# Patient Record
Sex: FEMALE | Race: WHITE | Employment: UNEMPLOYED | URBAN - METROPOLITAN AREA
[De-identification: names, ages, dates, MRNs, and addresses within clinical notes are randomized per-mention and may not be internally consistent; named-entity substitution may affect disease eponyms.]

---

## 2022-08-24 LAB — HBA1C MFR BLD HPLC: 5.7 %

## 2022-11-21 ENCOUNTER — HOSPITAL ENCOUNTER (EMERGENCY)
Age: 47
Discharge: HOME | End: 2022-11-21
Attending: EMERGENCY MEDICINE
Payer: MEDICAID

## 2022-11-21 ENCOUNTER — APPOINTMENT (OUTPATIENT)
Dept: GENERAL RADIOLOGY | Age: 47
End: 2022-11-21
Attending: EMERGENCY MEDICINE
Payer: MEDICAID

## 2022-11-21 VITALS
HEIGHT: 59 IN | RESPIRATION RATE: 19 BRPM | WEIGHT: 215 LBS | TEMPERATURE: 98.3 F | BODY MASS INDEX: 43.34 KG/M2 | OXYGEN SATURATION: 98 % | DIASTOLIC BLOOD PRESSURE: 73 MMHG | SYSTOLIC BLOOD PRESSURE: 127 MMHG | HEART RATE: 80 BPM

## 2022-11-21 DIAGNOSIS — J90 PLEURAL EFFUSION: ICD-10-CM

## 2022-11-21 DIAGNOSIS — R07.9 CHEST PAIN, UNSPECIFIED TYPE: Primary | ICD-10-CM

## 2022-11-21 DIAGNOSIS — I51.7 CARDIOMEGALY: ICD-10-CM

## 2022-11-21 LAB
ALBUMIN SERPL-MCNC: 3.3 G/DL (ref 3.5–5)
ALBUMIN/GLOB SERPL: 0.8 {RATIO} (ref 1.1–2.2)
ALP SERPL-CCNC: 66 U/L (ref 45–117)
ALT SERPL-CCNC: 44 U/L (ref 12–78)
ANION GAP SERPL CALC-SCNC: 8 MMOL/L (ref 5–15)
AST SERPL-CCNC: 22 U/L (ref 15–37)
ATRIAL RATE: 80 BPM
BASOPHILS # BLD: 0 K/UL (ref 0–0.1)
BASOPHILS NFR BLD: 1 % (ref 0–1)
BILIRUB SERPL-MCNC: 0.8 MG/DL (ref 0.2–1)
BNP SERPL-MCNC: 114 PG/ML
BUN SERPL-MCNC: 11 MG/DL (ref 6–20)
BUN/CREAT SERPL: 12 (ref 12–20)
CALCIUM SERPL-MCNC: 8.8 MG/DL (ref 8.5–10.1)
CALCULATED P AXIS, ECG09: 39 DEGREES
CALCULATED R AXIS, ECG10: -3 DEGREES
CALCULATED T AXIS, ECG11: 30 DEGREES
CHLORIDE SERPL-SCNC: 110 MMOL/L (ref 97–108)
CO2 SERPL-SCNC: 26 MMOL/L (ref 21–32)
CREAT SERPL-MCNC: 0.91 MG/DL (ref 0.55–1.02)
DIAGNOSIS, 93000: NORMAL
DIFFERENTIAL METHOD BLD: NORMAL
EOSINOPHIL # BLD: 0.2 K/UL (ref 0–0.4)
EOSINOPHIL NFR BLD: 4 % (ref 0–7)
ERYTHROCYTE [DISTWIDTH] IN BLOOD BY AUTOMATED COUNT: 14.5 % (ref 11.5–14.5)
GLOBULIN SER CALC-MCNC: 3.9 G/DL (ref 2–4)
GLUCOSE SERPL-MCNC: 100 MG/DL (ref 65–100)
HCT VFR BLD AUTO: 40.9 % (ref 35–47)
HGB BLD-MCNC: 13.2 G/DL (ref 11.5–16)
IMM GRANULOCYTES # BLD AUTO: 0 K/UL (ref 0–0.04)
IMM GRANULOCYTES NFR BLD AUTO: 0 % (ref 0–0.5)
LYMPHOCYTES # BLD: 2 K/UL (ref 0.8–3.5)
LYMPHOCYTES NFR BLD: 33 % (ref 12–49)
MAGNESIUM SERPL-MCNC: 1.9 MG/DL (ref 1.6–2.4)
MCH RBC QN AUTO: 31.1 PG (ref 26–34)
MCHC RBC AUTO-ENTMCNC: 32.3 G/DL (ref 30–36.5)
MCV RBC AUTO: 96.5 FL (ref 80–99)
MONOCYTES # BLD: 0.4 K/UL (ref 0–1)
MONOCYTES NFR BLD: 7 % (ref 5–13)
NEUTS SEG # BLD: 3.4 K/UL (ref 1.8–8)
NEUTS SEG NFR BLD: 55 % (ref 32–75)
NRBC # BLD: 0 K/UL (ref 0–0.01)
NRBC BLD-RTO: 0 PER 100 WBC
P-R INTERVAL, ECG05: 142 MS
PLATELET # BLD AUTO: 298 K/UL (ref 150–400)
PMV BLD AUTO: 9 FL (ref 8.9–12.9)
POTASSIUM SERPL-SCNC: 4 MMOL/L (ref 3.5–5.1)
PROT SERPL-MCNC: 7.2 G/DL (ref 6.4–8.2)
Q-T INTERVAL, ECG07: 418 MS
QRS DURATION, ECG06: 112 MS
QTC CALCULATION (BEZET), ECG08: 482 MS
RBC # BLD AUTO: 4.24 M/UL (ref 3.8–5.2)
SODIUM SERPL-SCNC: 144 MMOL/L (ref 136–145)
TROPONIN-HIGH SENSITIVITY: 10 NG/L (ref 0–51)
TROPONIN-HIGH SENSITIVITY: 11 NG/L (ref 0–51)
VENTRICULAR RATE, ECG03: 80 BPM
WBC # BLD AUTO: 6.2 K/UL (ref 3.6–11)

## 2022-11-21 PROCEDURE — 99284 EMERGENCY DEPT VISIT MOD MDM: CPT | Performed by: SPECIALIST

## 2022-11-21 PROCEDURE — 36415 COLL VENOUS BLD VENIPUNCTURE: CPT

## 2022-11-21 PROCEDURE — 84484 ASSAY OF TROPONIN QUANT: CPT

## 2022-11-21 PROCEDURE — 83735 ASSAY OF MAGNESIUM: CPT

## 2022-11-21 PROCEDURE — 83880 ASSAY OF NATRIURETIC PEPTIDE: CPT

## 2022-11-21 PROCEDURE — 74011250637 HC RX REV CODE- 250/637: Performed by: EMERGENCY MEDICINE

## 2022-11-21 PROCEDURE — 99285 EMERGENCY DEPT VISIT HI MDM: CPT

## 2022-11-21 PROCEDURE — 71045 X-RAY EXAM CHEST 1 VIEW: CPT

## 2022-11-21 PROCEDURE — 85025 COMPLETE CBC W/AUTO DIFF WBC: CPT

## 2022-11-21 PROCEDURE — 80053 COMPREHEN METABOLIC PANEL: CPT

## 2022-11-21 PROCEDURE — 93005 ELECTROCARDIOGRAM TRACING: CPT

## 2022-11-21 RX ORDER — RANOLAZINE 1000 MG/1
1000 TABLET, EXTENDED RELEASE ORAL 2 TIMES DAILY
Qty: 60 TABLET | Refills: 1 | Status: SHIPPED | OUTPATIENT
Start: 2022-11-21

## 2022-11-21 RX ADMIN — NITROGLYCERIN 1 INCH: 20 OINTMENT TOPICAL at 10:12

## 2022-11-21 NOTE — CONSULTS
[address] [facility]                                                          Cardiology Care Note               Anil Jackson MD, [address], [place], [address] [facility]             Phone [phone]; Fax [phone]           Mobile [phone]   Voice Mail [phone]     [x]Initial Encounter     []Follow-up    Patient Name: Raul Bolivar - :1975 - NPD:[id number]  Primary Cardiologist: [facility] Physicians: Tracey Ferrara [facility] Cardiologist: [facility] Physicians: Eunice Roman MD     Reason for encounter: Chest discomfort with negative troponin's    HPI:       Raul Bolivar is a 56 y.o. female with PMH significant for Ischemic CM  with CAD and 3 stents placed 5 yrs ago ,S/P ICD and currently in process of determining if she is a candidate for mitral ring procedure for LV remodeling. She eliceo a normal QRS so no BiV was placed. She states she is under a lot of stress. She is currently on Ranexa from  microvascular disease. She did have a heart cath recently with no disease amenable to intervention. Spoke with  from [address] [place] [address] regarding his patient and discussed with him increasing rannexa and patient is in agreement. She will follow up with Dr. Sameer Chester. Subjective:    Patient arrived via EMS from home with c/o CP that woke her up around 0200. Patient took sublingual nitro POA that relieved the pain. Patient stated pain is gone but has a slight dull ache. Per EMS, 324 ASA given en route. Denies worsening SOB. Patient noted increased lower extremity edema. In ER HS trop 10 and pBNP  114  CXR enlarged heart dimensions and left pleural effusion  ECG NSR        Raul Bolivar reports chest pressure/discomfort. Cardiac risk factors: family history, dyslipidemia, obesity, hypertension, stress.      Assessment and Plan     1) Chest discomfort  -recent cath with no disease requiring intervention per   -most likely component of microvascular disease  -will increase rannexa to 1000 mg bid  - Zohra Blackwood will arrange for follow   2) COPD  3) [place]  -no wearing CPAP regularly and last night did not use  -encourage to use regularly  4) CHF  -BNP normal  5) HTN  -ok  6) Dyslipidemia  -LDL goal is less than 70  7) Obesity  -weight loss is encouraged. OK to discharge per CARDS    Approximately 32 min in discussion with patient and her physician and reviewing chart 15-20 min.       ____________________________________________________________    Cardiac testing          Most recent HS troponins:  Recent Labs     11/21/22  1003   TROPHS 10       ECG: normal EKG, normal sinus rhythm, possible old septal MI    Review of Systems:    [x]All other systems reviewed and all negative except as written in HPI    [] Patient unable to provide secondary to condition    Past Medical History:   Diagnosis Date    Asthma 1980    Bronchiectasis ([facility] 1981    CAD (coronary artery disease)     Chronic obstructive pulmonary disease ([facility] 2017    Congestive heart failure (CHF) ([facility] 2017    Heart failure ([facility]     Hypertension 2013    Kidney stones 1999    Sleep apnea 2014    uses CPAP     Past Surgical History:   Procedure Laterality Date    BREAST SURGERY PROCEDURE UNLISTED      CARDIAC SURG PROCEDURE UNLIST  2017    stents x3    HX BREAST REDUCTION Bilateral 1997    HX CSF SHUNT  2002    HX GYN      HX HEART CATHETERIZATION  2017 and 2019    HX HYSTEROSCOPY WITH ENDOMETRIAL ABLATION  2018    HX LITHOTRIPSY      mutliple    HX TONSILLECTOMY  age 26    HX TUBAL LIGATION Bilateral 2002    NEUROLOGICAL PROCEDURE UNLISTED       Social Hx:  reports that she has never smoked. She has never used smokeless tobacco. She reports that she does not drink alcohol and does not use drugs. Family Hx: family history is not on file.   Allergies   Allergen Reactions    Adhesive Rash          OBJECTIVE:  Wt Readings from Last 3 Encounters:   11/21/22 215 lb (97.5 kg)   12/06/19 208 lb 5.4 oz (94.5 kg)   12/05/19 208 lb 7 oz (94.5 kg)     No intake or output data in the 24 hours ending 11/21/22 1335    Physical Exam:    Vitals:   Vitals:    11/21/22 0908 11/21/22 0909 11/21/22 1008   BP:  120/68 126/73   Pulse: 81 83 81   Resp:  18 18   Temp:  98.3 °F (36.8 °C)    SpO2:  96% 96%   Weight:  215 lb (97.5 kg)    Height:  4' 11\" (1.499 m)          Gen: Well-developed, well-nourished, in no acute distress, overweight  Neck: Supple, No JVD, No Carotid Bruit  Resp: No accessory muscle use, Clear breath sounds, No rales or rhonchi  Card: Regular Rate,Rythm, Normal S1, S2, No murmurs, rubs or gallop. No thrills. Abd:   Soft, non-tender, non-distended, BS+   MSK: No cyanosis  Skin: No rashes    Neuro: Moving all four extremities, follows commands appropriately  Psych: Good insight, oriented to person, place, alert, Nml Affect  LE: No edema    No specialty comments available. Data Review:     Radiology:   XR Results (most recent):  Results from Hospital Encounter encounter on 11/21/22    XR CHEST PORT    Narrative  EXAM:  XR CHEST PORT    INDICATION: Chest pain    COMPARISON: none    TECHNIQUE: portable chest AP view    FINDINGS there is mild enlargement of the cardiomediastinal silhouette. There is  a mild sized left pleural effusion. There is no focal infiltrate, cardiac  pacemaker. Impression  Enlarged heart with left pleural effusion. Recent Labs     11/21/22  1003      K 4.0   *   CO2 26   BUN 11   CREA 0.91      CA 8.8     Recent Labs     11/21/22  1003   WBC 6.2   HGB 13.2   HCT 40.9        Recent Labs     11/21/22  1003   AP 66     No results for input(s): CHOL, LDLC in the last 72 hours.     No lab exists for component: TGL, HDLC,  HBA1C      Current meds:    Current Facility-Administered Medications:     nitroglycerin (NITROBID) 2 % ointment 1 Inch, 1 Inch, Topical, BID, Memo Dumont MD, 1 Inch at 11/21/22 1012    Current Outpatient Medications:     docusate sodium (COLACE) 50 mg capsule, Take two tabs twice daily for one week, then twice a day as needed thereafter. Hold for loose stools. , Disp: 28 Cap, Rfl: 2    ondansetron (ZOFRAN ODT) 8 mg disintegrating tablet, Take 1 Tab by mouth every eight (8) hours as needed for Nausea., Disp: 10 Tab, Rfl: 2    mometasone-formoterol (DULERA) 100-5 mcg/actuation HFA inhaler, Take 2 Puffs by inhalation two (2) times a day., Disp: , Rfl:     ALBUTEROL SULFATE IN, Take 2 Puffs by inhalation two (2) times daily as needed (Rescue Inhaler). , Disp: , Rfl:     atorvastatin (LIPITOR) 80 mg tablet, Take 80 mg by mouth daily. , Disp: , Rfl:     aspirin 81 mg chewable tablet, Take 81 mg by mouth daily. , Disp: , Rfl:     sertraline (ZOLOFT) 50 mg tablet, Take 50 mg by mouth daily. , Disp: , Rfl:     tiotropium (SPIRIVA WITH HANDIHALER) 18 mcg inhalation capsule, Take 1 Cap by inhalation daily. , Disp: , Rfl:     carvedilol (COREG) 6.25 mg tablet, Take 6.25 mg by mouth two (2) times daily (with meals). , Disp: , Rfl:     ibuprofen (MOTRIN) 800 mg tablet, Take 800 mg by mouth every eight (8) hours as needed for Pain., Disp: , Rfl:     nitroglycerin (NITROSTAT) 0.4 mg SL tablet, 0.4 mg by SubLINGual route every five (5) minutes as needed for Chest Pain. Up to 3 doses. , Disp: , Rfl:     bumetanide (BUMEX) 1 mg tablet, Take 1 mg by mouth daily. , Disp: , Rfl:     ramipril (ALTACE) 2.5 mg capsule, Take 2.5 mg by mouth daily. , Disp: , Rfl:     OTHER, Take 1 Inhalation by mouth two (2) times a day. Indications: Albuterol nebulizer - pt not sure of strength, Disp: , Rfl:     Luciano Porras MD    [facility]  Call center: (Q) [phone] (g) [phone]      CC: Bandar Youssef NP

## 2022-11-21 NOTE — Clinical Note
[address]  [facility] EMERGENCY DEPT  [address] [facility]  [place] [address]  [phone]    Work/School Note    Date: 11/21/2022    To Whom It May concern:    Sylvia Sexton was seen and treated today in the emergency room by the following provider(s):  Attending Provider: Caitlin Retana MD.      Sylvia Sexton is excused from work/school on 11/21/2022 through 11/23/2022. She is medically clear to return to work/school on 11/24/2022.          Sincerely,          Jasmin Ramires MD

## 2022-11-21 NOTE — ED TRIAGE NOTES
Patient arrived via EMS from home with c/o CP that woke her up around 0200. Patient took sublingual nitro POA that relieved the pain. Patient stated pain is gone but has a slight dull ache. Per EMS, 324 ASA given en route. Denies worsening SOB. Patient noted increased lower extremity edema.      PMH Heart failure -EF 15% ICD placed

## 2022-11-21 NOTE — ED PROVIDER NOTES
Patient is a pleasant 44-year-old female with past medical history significant for asthma, coronary artery disease status post multiple stents, congestive heart failure with last EF in the 15% range, hypertension and apnea using CPAP. She states she is typically followed at [facility] for her cardiac care. She states she was last seen over the summer for heart failure exacerbation. She states she thinks they did a repeat cath and at that time she did not have any significant obstructions requiring intervention and she states she does not smoke cigarettes. She states she was told that her heart failure was caused by her heart disease. Denies significant peripheral swelling noting that she does swell in her legs if she stands for long but that it seems to resolve with elevation. Denies shortness of breath or orthopnea. Denies any fever. She states around 2:00 in the morning she was awoken by an on radiating chest pain that seemed to resolve after some time. She states she was able to go back to sleep for little while and then developed the pain again. She states she does have some diaphoresis associated but notes that she is often diaphoretic and does not think that that is anything new.        Past Medical History:   Diagnosis Date    Asthma 1980    Bronchiectasis ([address] 1981    CAD (coronary artery disease)     Chronic obstructive pulmonary disease ([address] 2017    Congestive heart failure (CHF) ([address] 2017    Heart failure ([address]     Hypertension 2013    Kidney stones 1999    Sleep apnea 2014    uses CPAP       Past Surgical History:   Procedure Laterality Date    BREAST SURGERY PROCEDURE UNLISTED      CARDIAC SURG PROCEDURE UNLIST  2017    stents x3    HX BREAST REDUCTION Bilateral 1997    HX CSF SHUNT  2002    HX GYN      HX HEART CATHETERIZATION  2017 and 2019    HX HYSTEROSCOPY WITH ENDOMETRIAL ABLATION  2018    HX LITHOTRIPSY      mutliple    HX TONSILLECTOMY  age 26    HX TUBAL LIGATION Bilateral 2002 NEUROLOGICAL PROCEDURE UNLISTED           No family history on file. Social History     Socioeconomic History    Marital status: SINGLE     Spouse name: Not on file    Number of children: Not on file    Years of education: Not on file    Highest education level: Not on file   Occupational History    Not on file   Tobacco Use    Smoking status: Never    Smokeless tobacco: Never   Substance and Sexual Activity    Alcohol use: Never    Drug use: Never    Sexual activity: Not on file   Other Topics Concern    Not on file   Social History Narrative    Not on file     Social Determinants of Health     Financial Resource Strain: Not on file   Food Insecurity: Not on file   Transportation Needs: Not on file   Physical Activity: Not on file   Stress: Not on file   Social Connections: Not on file   Intimate Partner Violence: Not on file   Housing Stability: Not on file         ALLERGIES: Adhesive    Review of Systems   Constitutional:  Positive for diaphoresis and fatigue. Negative for fever. HENT:  Negative for drooling. Respiratory:  Negative for shortness of breath. Cardiovascular:  Positive for chest pain and leg swelling. Gastrointestinal:  Negative for abdominal pain, nausea and vomiting. Musculoskeletal:  Negative for back pain and neck pain. Skin:  Negative for rash. Neurological:  Negative for seizures and syncope. Psychiatric/Behavioral: Negative. Vitals:    11/21/22 1338 11/21/22 1353 11/21/22 1408 11/21/22 1423   BP: 125/68 134/78 127/73    Pulse: 85 84 86 80   Resp: 24 17 21 19   Temp:       SpO2: 96% 96% 96% 98%   Weight:       Height:                Physical Exam  Vitals and nursing note reviewed. Constitutional:       Appearance: She is obese. She is not toxic-appearing or diaphoretic. HENT:      Head: Atraumatic. Eyes:      Pupils: Pupils are equal, round, and reactive to light. Neck:      Trachea: No tracheal deviation. Cardiovascular:      Rate and Rhythm: Normal rate. Heart sounds: No friction rub. Pulmonary:      Effort: Pulmonary effort is normal.      Breath sounds: Normal breath sounds. Chest:      Chest wall: No deformity or crepitus. Abdominal:      Palpations: Abdomen is soft. Tenderness: There is no abdominal tenderness. There is no guarding or rebound. Musculoskeletal:      Comments: Trace bilateral pitting edema   Skin:     General: Skin is warm and dry. Neurological:      Mental Status: She is alert and oriented to person, place, and time. Psychiatric:         Mood and Affect: Mood normal.         Behavior: Behavior normal.        MDM  Number of Diagnoses or Management Options  Cardiomegaly  Chest pain, unspecified type  Pleural effusion  Diagnosis management comments: Work up is reassuring. Pt evaluated in ED by cardiology and cleared for discharge after discussing pt with her primary cardiologist.  Medication change advised and given strict return precautions.        Amount and/or Complexity of Data Reviewed  Clinical lab tests: reviewed and ordered  Tests in the radiology section of CPT®: ordered  Discuss the patient with other providers: yes  Independent visualization of images, tracings, or specimens: yes    Patient Progress  Patient progress: stable    ED Course as of 11/25/22 1310   Mon Nov 21, 2022   1016 EG obtained at 913 showing sinus rhythm, rate 80, incomplete left bundle, LVH, specific T wave abnormality, prolonged QT, slightly changed compared to prior EKG [JE]      ED Course User Index  [JE] Micki Hernandez MD       Procedures Ejection Fraction...

## 2022-11-21 NOTE — ED NOTES
Patient stated last name is now Fareed Calhoun and not Allison Desouza. Registration to update chart. No need for chart merge, only legal name change.

## 2022-11-21 NOTE — DISCHARGE INSTRUCTIONS
Your work-up today was reassuring. The cardiologist here did discuss your care with your cardiologist and they recommend increasing your Ranexa to 1000 mg twice a day from 500 mg twice a day. Please follow closely with your cardiologist and return to the emergency department worsening symptoms.

## 2022-11-21 NOTE — Clinical Note
[address]  [facility] EMERGENCY DEPT  [address] [facility] [address]  [phone]    Work/School Note    Date: 11/21/2022    To Whom It May concern:    Jai Gage was seen and treated today in the emergency room by the following provider(s):  Attending Provider: Marcos Mallory MD.      Jai Gage is excused from work/school on 11/21/2022 through 11/23/2022. She is medically clear to return to work/school on 11/24/2022.          Sincerely,          Lora Padron MD

## 2023-05-24 NOTE — PATIENT INSTRUCTIONS

## 2023-05-25 ENCOUNTER — OFFICE VISIT (OUTPATIENT)
Age: 48
End: 2023-05-25

## 2023-05-25 DIAGNOSIS — I10 PRIMARY HYPERTENSION: ICD-10-CM

## 2023-05-25 DIAGNOSIS — E78.5 DYSLIPIDEMIA: Primary | ICD-10-CM

## 2023-05-26 NOTE — PROGRESS NOTES
CARDIOLOGY OFFICE NOTE    Natan Neumann MD, [address], [place], [address] [facility]  Phone [phone]; Fax [phone]  Mobile [phone]   Voice Mail [phone]    Primary care: YUE Davis NP       ATTENTION:   This medical record was transcribed using an electronic medical records/speech recognition system. Although proofread, it may and can contain electronic, spelling and other errors. Corrections may be executed at a later time. Please feel free to contact us for any clarifications as needed. Jr Cochran is a 53 y.o. female with  referred for chest discomfort            Cardiac risk factors: family history, dyslipidemia, obesity, hypertension, stress. HISTORY OF PRESENTING ILLNESS    Ms./Mr. Jr Cochran  53 y.o. is very nice lady I saw back in November 2022. She is a patient of Dr. Edvin Foster. History significant for ischemic cardiomyopathy with CAD and 3 stents placed about 5 years ago. She does have a ICD and there was some question of a mitral clip procedure for LV remodeling and that they were planning on proceeding with. She has a normal QRS but no BiV. She is on Ranexa for microvascular disease. She was discharged from the emergency room on increased dose of Ranexa was to follow-up with Dr. Telma Oro. At that time her troponin was 10 and proBNP was 114. EKG was normal sinus rhythm. ACTIVE PROBLEM LIST     There are no problems to display for this patient.           PAST MEDICAL HISTORY     Past Medical History:   Diagnosis Date    Asthma 1980    Bronchiectasis ([address] 1981    CAD (coronary artery disease)     Chronic obstructive pulmonary disease ([address] 2017    Congestive heart failure (CHF) ([address] 2017    Heart failure ([address]     Hypertension 2013    Kidney stones 1999    Sleep apnea 2014    uses CPAP           PAST SURGICAL HISTORY
Error
CVA (cerebral vascular accident)